# Patient Record
Sex: MALE | Race: WHITE | NOT HISPANIC OR LATINO | Employment: FULL TIME | URBAN - METROPOLITAN AREA
[De-identification: names, ages, dates, MRNs, and addresses within clinical notes are randomized per-mention and may not be internally consistent; named-entity substitution may affect disease eponyms.]

---

## 2020-11-05 ENCOUNTER — OFFICE VISIT (OUTPATIENT)
Dept: OBGYN CLINIC | Facility: CLINIC | Age: 40
End: 2020-11-05
Payer: COMMERCIAL

## 2020-11-05 VITALS
HEART RATE: 71 BPM | HEIGHT: 70 IN | BODY MASS INDEX: 29.35 KG/M2 | SYSTOLIC BLOOD PRESSURE: 135 MMHG | DIASTOLIC BLOOD PRESSURE: 85 MMHG | WEIGHT: 205 LBS

## 2020-11-05 DIAGNOSIS — M77.11 LATERAL EPICONDYLITIS OF RIGHT ELBOW: Primary | ICD-10-CM

## 2020-11-05 PROCEDURE — 99203 OFFICE O/P NEW LOW 30 MIN: CPT | Performed by: ORTHOPAEDIC SURGERY

## 2020-11-05 PROCEDURE — 20551 NJX 1 TENDON ORIGIN/INSJ: CPT | Performed by: ORTHOPAEDIC SURGERY

## 2020-11-05 RX ORDER — DEXTROAMPHETAMINE SACCHARATE, AMPHETAMINE ASPARTATE MONOHYDRATE, DEXTROAMPHETAMINE SULFATE AND AMPHETAMINE SULFATE 2.5; 2.5; 2.5; 2.5 MG/1; MG/1; MG/1; MG/1
10 CAPSULE, EXTENDED RELEASE ORAL EVERY MORNING
COMMUNITY

## 2020-11-05 RX ORDER — TRIAMCINOLONE ACETONIDE 40 MG/ML
20 INJECTION, SUSPENSION INTRA-ARTICULAR; INTRAMUSCULAR
Status: COMPLETED | OUTPATIENT
Start: 2020-11-05 | End: 2020-11-05

## 2020-11-05 RX ORDER — LIDOCAINE HYDROCHLORIDE 5 MG/ML
0.5 INJECTION, SOLUTION INFILTRATION; PERINEURAL
Status: COMPLETED | OUTPATIENT
Start: 2020-11-05 | End: 2020-11-05

## 2020-11-05 RX ADMIN — LIDOCAINE HYDROCHLORIDE 0.5 ML: 5 INJECTION, SOLUTION INFILTRATION; PERINEURAL at 09:42

## 2020-11-05 RX ADMIN — TRIAMCINOLONE ACETONIDE 20 MG: 40 INJECTION, SUSPENSION INTRA-ARTICULAR; INTRAMUSCULAR at 09:42

## 2020-12-15 ENCOUNTER — TELEPHONE (OUTPATIENT)
Dept: OBGYN CLINIC | Facility: CLINIC | Age: 40
End: 2020-12-15

## 2021-01-07 ENCOUNTER — APPOINTMENT (OUTPATIENT)
Dept: RADIOLOGY | Facility: CLINIC | Age: 41
End: 2021-01-07
Payer: COMMERCIAL

## 2021-01-07 ENCOUNTER — OFFICE VISIT (OUTPATIENT)
Dept: OBGYN CLINIC | Facility: CLINIC | Age: 41
End: 2021-01-07
Payer: COMMERCIAL

## 2021-01-07 VITALS
WEIGHT: 205 LBS | SYSTOLIC BLOOD PRESSURE: 134 MMHG | DIASTOLIC BLOOD PRESSURE: 84 MMHG | HEIGHT: 70 IN | BODY MASS INDEX: 29.35 KG/M2 | HEART RATE: 63 BPM

## 2021-01-07 DIAGNOSIS — M77.11 LATERAL EPICONDYLITIS OF RIGHT ELBOW: Primary | ICD-10-CM

## 2021-01-07 DIAGNOSIS — M77.11 LATERAL EPICONDYLITIS OF RIGHT ELBOW: ICD-10-CM

## 2021-01-07 PROCEDURE — 73080 X-RAY EXAM OF ELBOW: CPT

## 2021-01-07 PROCEDURE — 99213 OFFICE O/P EST LOW 20 MIN: CPT | Performed by: ORTHOPAEDIC SURGERY

## 2021-01-07 NOTE — PROGRESS NOTES
Assessment/Plan:  1  Lateral epicondylitis of right elbow  XR elbow 3+ vw right    MRI elbow right wo contrast       Scribe Attestation    I,:  Sara Sylvester MA am acting as a scribe while in the presence of the attending physician :       I,:  Myron Sanders DO personally performed the services described in this documentation    as scribed in my presence :             Salinas Parra has continued pain  He has tried and failed conservative treatment options in the form of activity modification, daily anti-inflammatories, bracing, a physician directed HEP, and a steroid injection all of which not provided him with any relief  An MRI of the right elbow was ordered today to evaluate for lateral epicondylitis  He will follow up once the MRI is complete to discuss the results  Subjective:   Laura Yi is a 38 y o  male who presents to the office today for follow up evaluation right elbow lateral epicondylitis  Patient underwent a steroid injection at his last visit on 11/5/20 which provided him with no relief  Patient states he has been compliant with the physician directed HEP  Patient states he did stop bowling for the past 3 weeks  He states he tried to return to bowling and this increased his pain  He notes pain to the lateral aspect of his elbow  He states this is increased with lifting and when he is bowling  He has been using the counerforce brace  He has also been taking Advil OTC as needed for pain  Review of Systems   Constitutional: Negative for chills and fever  HENT: Negative for drooling and sneezing  Eyes: Negative for redness  Respiratory: Negative for cough and wheezing  Gastrointestinal: Negative for nausea and vomiting  Musculoskeletal: Negative for arthralgias, joint swelling and myalgias  Neurological: Negative for weakness and numbness  Psychiatric/Behavioral: Negative for behavioral problems  The patient is not nervous/anxious  History reviewed   No pertinent past medical history  Past Surgical History:   Procedure Laterality Date    ELBOW SURGERY  2019    Ulnar Neuropathy        History reviewed  No pertinent family history  Social History     Occupational History    Not on file   Tobacco Use    Smoking status: Current Some Day Smoker    Smokeless tobacco: Never Used    Tobacco comment: VAPE   Substance and Sexual Activity    Alcohol use: Yes     Comment: Rarely    Drug use: Never    Sexual activity: Not on file         Current Outpatient Medications:     amphetamine-dextroamphetamine (ADDERALL XR) 10 MG 24 hr capsule, Take 10 mg by mouth every morning, Disp: , Rfl:     No Known Allergies    Objective:  Vitals:    01/07/21 0949   BP: 134/84   Pulse: 63       Ortho Exam     Right elbow    No pain with resisted wrist ext  NTTP lateral epicondyle   Compartment soft  Brisk capillary refill  S/m intact median, radial, and ulnar nerve     Physical Exam  Constitutional:       Appearance: He is well-developed  HENT:      Head: Normocephalic and atraumatic  Eyes:      General:         Right eye: No discharge  Left eye: No discharge  Conjunctiva/sclera: Conjunctivae normal    Neck:      Musculoskeletal: Normal range of motion and neck supple  Cardiovascular:      Rate and Rhythm: Normal rate  Pulmonary:      Effort: Pulmonary effort is normal  No respiratory distress  Musculoskeletal:      Comments: As noted in HPI   Skin:     General: Skin is warm and dry  Neurological:      Mental Status: He is alert and oriented to person, place, and time  Psychiatric:         Behavior: Behavior normal          Thought Content: Thought content normal          Judgment: Judgment normal          I have personally reviewed pertinent films in PACS and my interpretation is as follows:X-ray right elbow performed in the office today demonstrates no osseous abnormalities

## 2021-01-11 ENCOUNTER — TELEPHONE (OUTPATIENT)
Dept: OBGYN CLINIC | Facility: HOSPITAL | Age: 41
End: 2021-01-11

## 2021-01-11 NOTE — TELEPHONE ENCOUNTER
Patient sees Dr Donn Bowman from [facility] is calling because patient has an MRI scheduled for 1/15 and it requires a prior AUTH

## 2021-01-21 ENCOUNTER — OFFICE VISIT (OUTPATIENT)
Dept: OBGYN CLINIC | Facility: CLINIC | Age: 41
End: 2021-01-21
Payer: COMMERCIAL

## 2021-01-21 ENCOUNTER — TELEPHONE (OUTPATIENT)
Dept: OBGYN CLINIC | Facility: CLINIC | Age: 41
End: 2021-01-21

## 2021-01-21 VITALS
HEIGHT: 70 IN | BODY MASS INDEX: 29.35 KG/M2 | SYSTOLIC BLOOD PRESSURE: 148 MMHG | DIASTOLIC BLOOD PRESSURE: 78 MMHG | HEART RATE: 73 BPM | WEIGHT: 205 LBS

## 2021-01-21 DIAGNOSIS — M77.11 LATERAL EPICONDYLITIS OF RIGHT ELBOW: Primary | ICD-10-CM

## 2021-01-21 PROCEDURE — 99213 OFFICE O/P EST LOW 20 MIN: CPT | Performed by: ORTHOPAEDIC SURGERY

## 2021-01-21 RX ORDER — DEXAMETHASONE SODIUM PHOSPHATE 4 MG/ML
4 INJECTION, SOLUTION INTRA-ARTICULAR; INTRALESIONAL; INTRAMUSCULAR; INTRAVENOUS; SOFT TISSUE EVERY 6 HOURS SCHEDULED
Qty: 4 ML | Refills: 0 | Status: SHIPPED | OUTPATIENT
Start: 2021-01-21

## 2021-01-21 NOTE — PROGRESS NOTES
Assessment/Plan:  1  Lateral epicondylitis of right elbow  Ambulatory referral to Physical Therapy    dexamethasone (DECADRON) 4 mg/mL     Patient's MRI was positive for some mild tendinitis about the right elbow  I do not see anything surgical at this time  We have tried injections as well as time and bracing  At this point patient will try iontophoresis in therapy  They will also do some exercises with him  He will adjust his ergonomics at work to provide for better posture as this may be contributing to his elbow  He will follow up in 6 weeks after Ancef paresis in therapy  Subjective: Follow-up    Patient ID: Alonso Rubi is a 38 y o  male  HPI  Patient presents for follow-up for his right elbow pain  He again notices a bowling  He is able to bowl 3 games 1st 2 being relatively pain-free over the last name was not able to make it through did score lower  He denies any numbness or tingling in the hand  He otherwise does not have much pain  He notices that work is right elbow is bent most of the time  We did try injection in the past which was not successful  He has also been doing stretching which has been helping  He is here to go over his MRI results  Review of Systems   Constitutional: Negative for chills, fever and unexpected weight change  HENT: Negative for hearing loss, nosebleeds and sore throat  Eyes: Negative for pain, redness and visual disturbance  Respiratory: Negative for cough, shortness of breath and wheezing  Cardiovascular: Negative for chest pain, palpitations and leg swelling  Gastrointestinal: Negative for abdominal pain, nausea and vomiting  Endocrine: Negative for polyphagia and polyuria  Genitourinary: Negative for dysuria and hematuria  Musculoskeletal:        See HPI   Skin: Negative for rash and wound  Neurological: Negative for dizziness, numbness and headaches     Psychiatric/Behavioral: Negative for decreased concentration and suicidal ideas  The patient is not nervous/anxious  History reviewed  No pertinent past medical history  Past Surgical History:   Procedure Laterality Date    ELBOW SURGERY  2019    Ulnar Neuropathy        History reviewed  No pertinent family history  Social History     Occupational History    Not on file   Tobacco Use    Smoking status: Current Some Day Smoker    Smokeless tobacco: Never Used    Tobacco comment: VAPE   Substance and Sexual Activity    Alcohol use: Yes     Comment: Rarely    Drug use: Never    Sexual activity: Not on file         Current Outpatient Medications:     amphetamine-dextroamphetamine (ADDERALL XR) 10 MG 24 hr capsule, Take 10 mg by mouth every morning, Disp: , Rfl:     dexamethasone (DECADRON) 4 mg/mL, 1 mL (4 mg total) by Iontophoresis route every 6 (six) hours, Disp: 4 mL, Rfl: 0    No Known Allergies    Objective:  Vitals:    01/21/21 0943   BP: 148/78   Pulse: 73       Body mass index is 29 84 kg/m²  Ortho Exam     Right elbow  Nontender over lateral epicondyle  Mildly tender over the extensor mass  Compartment soft  Brisk cap refill  5/5 triceps strength  Nontender over triceps  Full pronation supination  Sensation motor intact median radial ulnar nerve    Physical Exam  Vitals signs reviewed  Constitutional:       Appearance: He is well-developed  HENT:      Head: Normocephalic and atraumatic  Eyes:      Conjunctiva/sclera: Conjunctivae normal       Pupils: Pupils are equal, round, and reactive to light  Neck:      Musculoskeletal: Normal range of motion and neck supple  Cardiovascular:      Rate and Rhythm: Normal rate  Pulmonary:      Effort: Pulmonary effort is normal  No respiratory distress  Musculoskeletal:      Comments: As noted in HPI   Skin:     General: Skin is warm and dry  Neurological:      Mental Status: He is alert and oriented to person, place, and time     Psychiatric:         Behavior: Behavior normal          I have personally reviewed pertinent films in PACS  MRI of the right elbow demonstrates no increased uptake at the lateral epicondyle  There are no masses or lesions  There is very minimal inflammation around the extensor muscles

## 2021-01-21 NOTE — TELEPHONE ENCOUNTER
[facility] pharmacy called in requesting a call back  They are requesting clarification on medication script they received         Please advise,

## 2021-01-25 ENCOUNTER — EVALUATION (OUTPATIENT)
Dept: PHYSICAL THERAPY | Facility: CLINIC | Age: 41
End: 2021-01-25
Payer: COMMERCIAL

## 2021-01-25 DIAGNOSIS — M77.11 LATERAL EPICONDYLITIS OF RIGHT ELBOW: ICD-10-CM

## 2021-01-25 PROCEDURE — 97161 PT EVAL LOW COMPLEX 20 MIN: CPT | Performed by: PHYSICAL THERAPIST

## 2021-01-25 NOTE — PROGRESS NOTES
PT Evaluation     Today's date: 2021  Patient name: Oniel Mcfadden  : 1980  MRN: [id number]  Referring provider: Maribeth Garcia DO  Dx:   Encounter Diagnosis     ICD-10-CM    1  Lateral epicondylitis of right elbow  M77 11 Ambulatory referral to Physical Therapy       Start Time: 845  Stop Time: 930  Total time in clinic (min): 45 minutes    Assessment  Assessment details: Oniel Mcfadden is a 38 y o  male presenting today with symptoms consistent with R lateral epicondylitis particularly with bowling and the following impairments including: pain with function, impaired physical strength, poor body mechanics, impaired ROM, and poor body mechanics  Additionally, he presents with functional deficits including: pain while bowling and pain while holding his infant baby  Oniel Mcfadden would likely benefit from skilled physical therapy to address the above impairments through a targeted program emphasizing elbow and wrist extension ROM, eccentric exercises to reload tendon in an appropriate fashion, and functional exercises to return to bowling without pain or irritability  Impairments: abnormal or restricted ROM, impaired physical strength, pain with function and poor body mechanics    Symptom irritability: lowUnderstanding of Dx/Px/POC: good   Prognosis: good    Goals  STG 1-2 Weeks:    1  Patient will decrease pain with bowling to less than 4/10   2  Patient will increase elbow extension ROM to -1 degrees to return to full motion  3  Patient will increase triceps strength to 4+/5 in order to facilitate return to holding child without pain  LTG 4-6 Weeks:    1  Patient will increase extensor digitorum strength to greater than 4+/5 to return to bowling without pain  2  Patient will decrease pain with bowling to less than 1/10 pain to return to competitive level without pain  3  Patient will be independent with HEP and demonstrate good understanding of tendon rehab      Plan  Patient would benefit from: PT eval and skilled physical therapy  Planned modality interventions: iontophoresis, TENS, electrical stimulation/Russian stimulation, thermotherapy: hydrocollator packs and cryotherapy  Planned therapy interventions: manual therapy, joint mobilization, activity modification, ADL training, patient education, neuromuscular re-education, flexibility, functional ROM exercises, therapeutic exercise, therapeutic training, therapeutic activities, stretching, strengthening, graded activity, graded exercise, graded motor and home exercise program  Frequency: 2x week  Plan of Care expiration date: 3/31/2021  Treatment plan discussed with: patient        Subjective Evaluation    History of Present Illness  Mechanism of injury: Sidney Valdivia is 38 y o  male presenting today with lateral R elbow pain that increases while bowling beginning four months prior  He has a history of ulnar transposition approximately 8 years ago  Prior to this past week, he had taken a 6 week break from bowling, but upon bowling a third game last week his pain again increased until he was unable to continue bowling at his normal level  He reports no numbness or tingling and no instance of trauma  He's had a steroid injection and an x-ray and MRI showing no abnormal findings  Quality of life: good    Pain  Current pain ratin  At best pain ratin  At worst pain ratin  Quality: dull ache and sharp    Social Support  Lives with: spouse and young children    Hand dominance: right      Diagnostic Tests  X-ray: normal  MRI studies: normal  Treatments  Current treatment: physical therapy  Patient Goals  Patient goals for therapy: increased strength, decreased pain and return to sport/leisure activities  Patient goal: Return to bowling at normal level without pain          Objective     Active Range of Motion     Left Elbow   Flexion: 145 degrees   Extension: -1 degrees   Forearm supination: 89 degrees     Right Elbow   Flexion: 142 degrees   Extension: -6 degrees with pain  Forearm supination: 75 degrees     Strength/Myotome Testing     Left Elbow   Flexion: 4+  Extension: 4+  Forearm supination: 4+  Forearm pronation: 4+    Right Elbow   Flexion: 4+  Extension: 4  Forearm supination: 4  Forearm pronation: 4    Left Wrist/Hand   Wrist extension: 4+  Wrist flexion: 4+     (2nd hand position)     Trial 1: 134    Right Wrist/Hand   Wrist extension: 4  Wrist flexion: 4+     (2nd hand position)     Trial 1: 145             Precautions: No past medical history on file          Manuals 1/25            STM              Wrist Ext Stretch                                       Neuro Re-Ed                                                                                                        Ther Ex             Wrist Ecc Elb 90             Wrist Ecc Ecc 0                                                                                           Ther Activity                                       Gait Training                                       Modalities

## 2021-03-01 NOTE — PROGRESS NOTES
Discharge: 3/1/2021    Avelina Gregory is a 38 y o  male who was seen for one visit related to lateral epicondylitis and lateral elbow pain  He is unable to continue physical therapy due to constraints with work and home schedule  Patient has been discharged from physical therapy at this time